# Patient Record
Sex: FEMALE | Race: OTHER | HISPANIC OR LATINO | URBAN - METROPOLITAN AREA
[De-identification: names, ages, dates, MRNs, and addresses within clinical notes are randomized per-mention and may not be internally consistent; named-entity substitution may affect disease eponyms.]

---

## 2018-04-03 ENCOUNTER — EMERGENCY (EMERGENCY)
Facility: HOSPITAL | Age: 23
LOS: 1 days | Discharge: HOME | End: 2018-04-03
Admitting: EMERGENCY MEDICINE
Payer: MEDICAID

## 2018-04-03 VITALS
RESPIRATION RATE: 15 BRPM | SYSTOLIC BLOOD PRESSURE: 125 MMHG | TEMPERATURE: 98 F | OXYGEN SATURATION: 99 % | HEART RATE: 88 BPM | DIASTOLIC BLOOD PRESSURE: 83 MMHG

## 2018-04-03 PROCEDURE — 99283 EMERGENCY DEPT VISIT LOW MDM: CPT

## 2018-04-03 NOTE — ED ADULT TRIAGE NOTE - CHIEF COMPLAINT QUOTE
pt c/o vaginal spotting x 1 month. was seen by obgyn, states she did not do pap smear but said she may have polycystic ovarian syndrome. no hx of anemia or blood transfusion. LMP 3/19. appears in nad, no c/o pain

## 2018-04-04 LAB
APPEARANCE UR: CLEAR — SIGNIFICANT CHANGE UP
BASOPHILS # BLD AUTO: 0.03 K/UL — SIGNIFICANT CHANGE UP (ref 0–0.2)
BASOPHILS NFR BLD AUTO: 0.3 % — SIGNIFICANT CHANGE UP (ref 0–2)
BILIRUB UR-MCNC: NEGATIVE — SIGNIFICANT CHANGE UP
BLOOD UR QL VISUAL: HIGH
COLOR SPEC: YELLOW — SIGNIFICANT CHANGE UP
EOSINOPHIL # BLD AUTO: 0.15 K/UL — SIGNIFICANT CHANGE UP (ref 0–0.5)
EOSINOPHIL NFR BLD AUTO: 1.7 % — SIGNIFICANT CHANGE UP (ref 0–6)
GLUCOSE UR-MCNC: NEGATIVE — SIGNIFICANT CHANGE UP
HCT VFR BLD CALC: 38.6 % — SIGNIFICANT CHANGE UP (ref 34.5–45)
HGB BLD-MCNC: 13.5 G/DL — SIGNIFICANT CHANGE UP (ref 11.5–15.5)
IMM GRANULOCYTES # BLD AUTO: 0.03 # — SIGNIFICANT CHANGE UP
IMM GRANULOCYTES NFR BLD AUTO: 0.3 % — SIGNIFICANT CHANGE UP (ref 0–1.5)
KETONES UR-MCNC: SIGNIFICANT CHANGE UP
LEUKOCYTE ESTERASE UR-ACNC: NEGATIVE — SIGNIFICANT CHANGE UP
LYMPHOCYTES # BLD AUTO: 2.61 K/UL — SIGNIFICANT CHANGE UP (ref 1–3.3)
LYMPHOCYTES # BLD AUTO: 29.7 % — SIGNIFICANT CHANGE UP (ref 13–44)
MCHC RBC-ENTMCNC: 31.3 PG — SIGNIFICANT CHANGE UP (ref 27–34)
MCHC RBC-ENTMCNC: 35 % — SIGNIFICANT CHANGE UP (ref 32–36)
MCV RBC AUTO: 89.4 FL — SIGNIFICANT CHANGE UP (ref 80–100)
MONOCYTES # BLD AUTO: 0.68 K/UL — SIGNIFICANT CHANGE UP (ref 0–0.9)
MONOCYTES NFR BLD AUTO: 7.7 % — SIGNIFICANT CHANGE UP (ref 2–14)
MUCOUS THREADS # UR AUTO: SIGNIFICANT CHANGE UP
NEUTROPHILS # BLD AUTO: 5.28 K/UL — SIGNIFICANT CHANGE UP (ref 1.8–7.4)
NEUTROPHILS NFR BLD AUTO: 60.3 % — SIGNIFICANT CHANGE UP (ref 43–77)
NITRITE UR-MCNC: NEGATIVE — SIGNIFICANT CHANGE UP
NON-SQ EPI CELLS # UR AUTO: <1 — SIGNIFICANT CHANGE UP
NRBC # FLD: 0 — SIGNIFICANT CHANGE UP
PH UR: 6 — SIGNIFICANT CHANGE UP (ref 4.6–8)
PLATELET # BLD AUTO: 316 K/UL — SIGNIFICANT CHANGE UP (ref 150–400)
PMV BLD: 10.3 FL — SIGNIFICANT CHANGE UP (ref 7–13)
PROT UR-MCNC: 20 MG/DL — SIGNIFICANT CHANGE UP
RBC # BLD: 4.32 M/UL — SIGNIFICANT CHANGE UP (ref 3.8–5.2)
RBC # FLD: 12.1 % — SIGNIFICANT CHANGE UP (ref 10.3–14.5)
RBC CASTS # UR COMP ASSIST: HIGH (ref 0–?)
SP GR SPEC: 1.03 — SIGNIFICANT CHANGE UP (ref 1–1.04)
SQUAMOUS # UR AUTO: SIGNIFICANT CHANGE UP
UROBILINOGEN FLD QL: NORMAL MG/DL — SIGNIFICANT CHANGE UP
WBC # BLD: 8.78 K/UL — SIGNIFICANT CHANGE UP (ref 3.8–10.5)
WBC # FLD AUTO: 8.78 K/UL — SIGNIFICANT CHANGE UP (ref 3.8–10.5)
WBC UR QL: SIGNIFICANT CHANGE UP (ref 0–?)

## 2018-04-04 NOTE — ED PROVIDER NOTE - PLAN OF CARE
Rest, drink plenty of fluids.  Advance activity as tolerated.  Continue all previously prescribed medications as directed.  Follow up with your primary care physician in 48-72 hours- bring copies of your results.  Return to the Emergency Department for dizziness, lightheadedness, palpitations, heavy bleeding, worsening or persistent symptoms OR ANY NEW OR CONCERNING SYMPTOMS.

## 2018-04-04 NOTE — ED PROVIDER NOTE - CHPI ED SYMPTOMS NEG
no lightheadedness, no palpitations, no chest pain, no SOB, no abdominal pain/no chills/no fever/no burning urination/no dysuria/no hematuria

## 2018-04-04 NOTE — ED PROVIDER NOTE - OBJECTIVE STATEMENT
24y/o F with PMHx of irregular periods presents to the ED c/o 1mo of intermittent vaginal spotting. Pt visited her GYN on Thursday who bella blood and is working her up for PCOS. Pt is to call tomorrow to f/u with another appointment. Pt states she came in today because her GYN "did not have time" to do an outpatient pap smear and is still spotting. Denies fevers/chills, lightheadedness, palpitations, chest pain, SOB, abdominal pain, burning urination, dysuria, hematuria, any other complaints. NKDA.

## 2018-04-04 NOTE — ED PROVIDER NOTE - PROGRESS NOTE DETAILS
susan warner: ua without infection, cbc wnl. ucg negative. will dc with ob/gyn is supposed to call tomorrow. pt agrees with plan. ready for dc.

## 2018-04-04 NOTE — ED PROVIDER NOTE - CARE PLAN
Assessment and plan of treatment:	Rest, drink plenty of fluids.  Advance activity as tolerated.  Continue all previously prescribed medications as directed.  Follow up with your primary care physician in 48-72 hours- bring copies of your results.  Return to the Emergency Department for dizziness, lightheadedness, palpitations, heavy bleeding, worsening or persistent symptoms OR ANY NEW OR CONCERNING SYMPTOMS. Principal Discharge DX:	Vaginal spotting  Assessment and plan of treatment:	Rest, drink plenty of fluids.  Advance activity as tolerated.  Continue all previously prescribed medications as directed.  Follow up with your primary care physician in 48-72 hours- bring copies of your results.  Return to the Emergency Department for dizziness, lightheadedness, palpitations, heavy bleeding, worsening or persistent symptoms OR ANY NEW OR CONCERNING SYMPTOMS.

## 2018-04-04 NOTE — ED PROVIDER NOTE - MEDICAL DECISION MAKING DETAILS
24y/o F with irregular periods with vaginal spotting. Will check UCG, UA, urine culture, CBC given length of bleed, outpatient GYN f/u. 24y/o F with irregular periods with vaginal spotting. Will check UCG, UA, urine culture, CBC given length of bleed, likely outpatient GYN f/u.

## 2018-04-05 LAB
BACTERIA UR CULT: SIGNIFICANT CHANGE UP
SPECIMEN SOURCE: SIGNIFICANT CHANGE UP

## 2020-01-03 ENCOUNTER — EMERGENCY (EMERGENCY)
Facility: HOSPITAL | Age: 25
LOS: 1 days | Discharge: HOME | End: 2020-01-03
Admitting: EMERGENCY MEDICINE
Payer: SELF-PAY

## 2020-01-03 VITALS
RESPIRATION RATE: 16 BRPM | HEART RATE: 96 BPM | TEMPERATURE: 98 F | SYSTOLIC BLOOD PRESSURE: 122 MMHG | DIASTOLIC BLOOD PRESSURE: 73 MMHG | OXYGEN SATURATION: 100 %

## 2020-01-03 PROBLEM — N92.6 IRREGULAR MENSTRUATION, UNSPECIFIED: Chronic | Status: ACTIVE | Noted: 2018-04-04

## 2020-01-03 PROCEDURE — 99284 EMERGENCY DEPT VISIT MOD MDM: CPT

## 2020-01-03 PROCEDURE — 73564 X-RAY EXAM KNEE 4 OR MORE: CPT | Mod: 26,RT

## 2020-01-03 RX ORDER — KETOROLAC TROMETHAMINE 30 MG/ML
30 SYRINGE (ML) INJECTION ONCE
Refills: 0 | Status: DISCONTINUED | OUTPATIENT
Start: 2020-01-03 | End: 2020-01-03

## 2020-01-03 RX ADMIN — Medication 30 MILLIGRAM(S): at 15:39

## 2020-01-03 NOTE — ED ADULT TRIAGE NOTE - CHIEF COMPLAINT QUOTE
A&OX3 c/o medications refill pt was treated for blister on upper lip last week and states its still there would like additional medication A&OX3 c/o right knee s/p fall one week ago, denies numbness tingling

## 2020-01-03 NOTE — ED PROVIDER NOTE - OBJECTIVE STATEMENT
23yo F w/ no pmhx presents c/o R knee pain x 3 weeks. Pt had mechanical fall, +blunt trauma to R knee, now w/ pain w/ weight bearing. Taking NSAID's w/ no relief. Denies numbness or paresthesias of extremities, calf swelling, fevers, chills, cp, sob.

## 2020-01-03 NOTE — ED PROVIDER NOTE - LOWER EXTREMITY EXAM, RIGHT
medial joint line tenderness, +valgus stress test, neg lachmans ,neg ant/post drawer, pt ambulatory, no redness or warmth of knee joint, no significant effusion

## 2020-01-03 NOTE — ED PROVIDER NOTE - PATIENT PORTAL LINK FT
You can access the FollowMyHealth Patient Portal offered by [facility] by registering at the following website: [email or web address]. By joining [employer]’s FollowMyHealth portal, you will also be able to view your health information using other applications (apps) compatible with our system.

## 2020-01-03 NOTE — ED PROVIDER NOTE - CARE PLAN
Principal Discharge DX:	Knee pain  Assessment and plan of treatment:	Follow up with your primary care provider within 48 hours  Follow up with an orthopedic doctor within one week  Take Motrin 600mg every 8 hours as needed for pain, take with food  Return to the emergency department with any worsening or concerning symptoms, swelling, numbness/tingling, inability to bear weight or any other concerns.

## 2020-11-03 ENCOUNTER — RESULT REVIEW (OUTPATIENT)
Age: 25
End: 2020-11-03

## 2021-11-26 ENCOUNTER — TRANSCRIPTION ENCOUNTER (OUTPATIENT)
Age: 26
End: 2021-11-26

## 2022-02-05 ENCOUNTER — TRANSCRIPTION ENCOUNTER (OUTPATIENT)
Age: 27
End: 2022-02-05

## 2022-02-14 PROBLEM — Z00.00 ENCOUNTER FOR PREVENTIVE HEALTH EXAMINATION: Status: ACTIVE | Noted: 2022-02-14

## 2022-02-15 ENCOUNTER — APPOINTMENT (OUTPATIENT)
Dept: PEDIATRIC CARDIOLOGY | Facility: CLINIC | Age: 27
End: 2022-02-15
Payer: COMMERCIAL

## 2022-02-15 PROCEDURE — 93325 DOPPLER ECHO COLOR FLOW MAPG: CPT | Mod: 59

## 2022-02-15 PROCEDURE — 76821 MIDDLE CEREBRAL ARTERY ECHO: CPT

## 2022-02-15 PROCEDURE — 76825 ECHO EXAM OF FETAL HEART: CPT

## 2022-02-15 PROCEDURE — 76827 ECHO EXAM OF FETAL HEART: CPT

## 2022-02-15 PROCEDURE — 99241 OFFICE CONSULTATION NEW/ESTAB PATIENT 15 MIN: CPT

## 2022-02-15 PROCEDURE — 76820 UMBILICAL ARTERY ECHO: CPT

## 2022-06-11 ENCOUNTER — INPATIENT (INPATIENT)
Facility: HOSPITAL | Age: 27
LOS: 1 days | Discharge: HOME | End: 2022-06-13
Attending: SURGERY | Admitting: SURGERY
Payer: MEDICAID

## 2022-06-11 VITALS
OXYGEN SATURATION: 100 % | RESPIRATION RATE: 18 BRPM | DIASTOLIC BLOOD PRESSURE: 85 MMHG | TEMPERATURE: 98 F | SYSTOLIC BLOOD PRESSURE: 135 MMHG | HEART RATE: 96 BPM

## 2022-06-11 DIAGNOSIS — Z98.891 HISTORY OF UTERINE SCAR FROM PREVIOUS SURGERY: Chronic | ICD-10-CM

## 2022-06-11 DIAGNOSIS — K85.10 BILIARY ACUTE PANCREATITIS WITHOUT NECROSIS OR INFECTION: ICD-10-CM

## 2022-06-11 LAB
ALBUMIN SERPL ELPH-MCNC: 4.4 G/DL — SIGNIFICANT CHANGE UP (ref 3.3–5)
ALP SERPL-CCNC: 155 U/L — HIGH (ref 40–120)
ALT FLD-CCNC: 67 U/L — HIGH (ref 4–33)
ANION GAP SERPL CALC-SCNC: 13 MMOL/L — SIGNIFICANT CHANGE UP (ref 7–14)
APPEARANCE UR: CLEAR — SIGNIFICANT CHANGE UP
AST SERPL-CCNC: 59 U/L — HIGH (ref 4–32)
BASOPHILS # BLD AUTO: 0.01 K/UL — SIGNIFICANT CHANGE UP (ref 0–0.2)
BASOPHILS NFR BLD AUTO: 0.1 % — SIGNIFICANT CHANGE UP (ref 0–2)
BILIRUB SERPL-MCNC: 0.7 MG/DL — SIGNIFICANT CHANGE UP (ref 0.2–1.2)
BILIRUB UR-MCNC: ABNORMAL
BLOOD GAS VENOUS COMPREHENSIVE RESULT: SIGNIFICANT CHANGE UP
BUN SERPL-MCNC: 9 MG/DL — SIGNIFICANT CHANGE UP (ref 7–23)
CALCIUM SERPL-MCNC: 9 MG/DL — SIGNIFICANT CHANGE UP (ref 8.4–10.5)
CHLORIDE SERPL-SCNC: 104 MMOL/L — SIGNIFICANT CHANGE UP (ref 98–107)
CHOLEST SERPL-MCNC: 164 MG/DL — SIGNIFICANT CHANGE UP
CO2 SERPL-SCNC: 23 MMOL/L — SIGNIFICANT CHANGE UP (ref 22–31)
COLOR SPEC: ABNORMAL
CREAT SERPL-MCNC: 0.62 MG/DL — SIGNIFICANT CHANGE UP (ref 0.5–1.3)
DIFF PNL FLD: ABNORMAL
EGFR: 127 ML/MIN/1.73M2 — SIGNIFICANT CHANGE UP
EOSINOPHIL # BLD AUTO: 0.05 K/UL — SIGNIFICANT CHANGE UP (ref 0–0.5)
EOSINOPHIL NFR BLD AUTO: 0.6 % — SIGNIFICANT CHANGE UP (ref 0–6)
FLUAV AG NPH QL: SIGNIFICANT CHANGE UP
FLUBV AG NPH QL: SIGNIFICANT CHANGE UP
GLUCOSE SERPL-MCNC: 101 MG/DL — HIGH (ref 70–99)
GLUCOSE UR QL: NEGATIVE — SIGNIFICANT CHANGE UP
HCT VFR BLD CALC: 36.8 % — SIGNIFICANT CHANGE UP (ref 34.5–45)
HDLC SERPL-MCNC: 62 MG/DL — SIGNIFICANT CHANGE UP
HGB BLD-MCNC: 11.7 G/DL — SIGNIFICANT CHANGE UP (ref 11.5–15.5)
IANC: 6.12 K/UL — SIGNIFICANT CHANGE UP (ref 1.8–7.4)
IMM GRANULOCYTES NFR BLD AUTO: 0.1 % — SIGNIFICANT CHANGE UP (ref 0–1.5)
KETONES UR-MCNC: ABNORMAL
LEUKOCYTE ESTERASE UR-ACNC: NEGATIVE — SIGNIFICANT CHANGE UP
LIDOCAIN IGE QN: >3000 U/L — SIGNIFICANT CHANGE UP (ref 7–60)
LIPID PNL WITH DIRECT LDL SERPL: 83 MG/DL — SIGNIFICANT CHANGE UP
LYMPHOCYTES # BLD AUTO: 1.29 K/UL — SIGNIFICANT CHANGE UP (ref 1–3.3)
LYMPHOCYTES # BLD AUTO: 16.4 % — SIGNIFICANT CHANGE UP (ref 13–44)
MCHC RBC-ENTMCNC: 27.9 PG — SIGNIFICANT CHANGE UP (ref 27–34)
MCHC RBC-ENTMCNC: 31.8 GM/DL — LOW (ref 32–36)
MCV RBC AUTO: 87.6 FL — SIGNIFICANT CHANGE UP (ref 80–100)
MONOCYTES # BLD AUTO: 0.38 K/UL — SIGNIFICANT CHANGE UP (ref 0–0.9)
MONOCYTES NFR BLD AUTO: 4.8 % — SIGNIFICANT CHANGE UP (ref 2–14)
NEUTROPHILS # BLD AUTO: 6.12 K/UL — SIGNIFICANT CHANGE UP (ref 1.8–7.4)
NEUTROPHILS NFR BLD AUTO: 78 % — HIGH (ref 43–77)
NITRITE UR-MCNC: NEGATIVE — SIGNIFICANT CHANGE UP
NON HDL CHOLESTEROL: 102 MG/DL — SIGNIFICANT CHANGE UP
NRBC # BLD: 0 /100 WBCS — SIGNIFICANT CHANGE UP
NRBC # FLD: 0 K/UL — SIGNIFICANT CHANGE UP
PH UR: 6.5 — SIGNIFICANT CHANGE UP (ref 5–8)
PLATELET # BLD AUTO: 358 K/UL — SIGNIFICANT CHANGE UP (ref 150–400)
POTASSIUM SERPL-MCNC: 3.7 MMOL/L — SIGNIFICANT CHANGE UP (ref 3.5–5.3)
POTASSIUM SERPL-SCNC: 3.7 MMOL/L — SIGNIFICANT CHANGE UP (ref 3.5–5.3)
PROT SERPL-MCNC: 7.3 G/DL — SIGNIFICANT CHANGE UP (ref 6–8.3)
PROT UR-MCNC: ABNORMAL
RBC # BLD: 4.2 M/UL — SIGNIFICANT CHANGE UP (ref 3.8–5.2)
RBC # FLD: 12.9 % — SIGNIFICANT CHANGE UP (ref 10.3–14.5)
RSV RNA NPH QL NAA+NON-PROBE: SIGNIFICANT CHANGE UP
SARS-COV-2 RNA SPEC QL NAA+PROBE: SIGNIFICANT CHANGE UP
SODIUM SERPL-SCNC: 140 MMOL/L — SIGNIFICANT CHANGE UP (ref 135–145)
SP GR SPEC: 1.05 — SIGNIFICANT CHANGE UP (ref 1–1.05)
TRIGL SERPL-MCNC: 96 MG/DL — SIGNIFICANT CHANGE UP
UROBILINOGEN FLD QL: ABNORMAL
WBC # BLD: 7.86 K/UL — SIGNIFICANT CHANGE UP (ref 3.8–10.5)
WBC # FLD AUTO: 7.86 K/UL — SIGNIFICANT CHANGE UP (ref 3.8–10.5)

## 2022-06-11 PROCEDURE — 99285 EMERGENCY DEPT VISIT HI MDM: CPT

## 2022-06-11 PROCEDURE — 76705 ECHO EXAM OF ABDOMEN: CPT | Mod: 26

## 2022-06-11 PROCEDURE — 99222 1ST HOSP IP/OBS MODERATE 55: CPT

## 2022-06-11 RX ORDER — ENOXAPARIN SODIUM 100 MG/ML
40 INJECTION SUBCUTANEOUS EVERY 24 HOURS
Refills: 0 | Status: DISCONTINUED | OUTPATIENT
Start: 2022-06-11 | End: 2022-06-13

## 2022-06-11 RX ORDER — HYDROMORPHONE HYDROCHLORIDE 2 MG/ML
0.5 INJECTION INTRAMUSCULAR; INTRAVENOUS; SUBCUTANEOUS EVERY 4 HOURS
Refills: 0 | Status: DISCONTINUED | OUTPATIENT
Start: 2022-06-11 | End: 2022-06-12

## 2022-06-11 RX ORDER — ONDANSETRON 8 MG/1
4 TABLET, FILM COATED ORAL ONCE
Refills: 0 | Status: COMPLETED | OUTPATIENT
Start: 2022-06-11 | End: 2022-06-11

## 2022-06-11 RX ORDER — HYDROMORPHONE HYDROCHLORIDE 2 MG/ML
1 INJECTION INTRAMUSCULAR; INTRAVENOUS; SUBCUTANEOUS EVERY 4 HOURS
Refills: 0 | Status: DISCONTINUED | OUTPATIENT
Start: 2022-06-11 | End: 2022-06-12

## 2022-06-11 RX ORDER — MORPHINE SULFATE 50 MG/1
4 CAPSULE, EXTENDED RELEASE ORAL ONCE
Refills: 0 | Status: DISCONTINUED | OUTPATIENT
Start: 2022-06-11 | End: 2022-06-11

## 2022-06-11 RX ORDER — ONDANSETRON 8 MG/1
4 TABLET, FILM COATED ORAL EVERY 8 HOURS
Refills: 0 | Status: DISCONTINUED | OUTPATIENT
Start: 2022-06-11 | End: 2022-06-13

## 2022-06-11 RX ORDER — SODIUM CHLORIDE 9 MG/ML
1000 INJECTION, SOLUTION INTRAVENOUS
Refills: 0 | Status: DISCONTINUED | OUTPATIENT
Start: 2022-06-11 | End: 2022-06-11

## 2022-06-11 RX ORDER — ACETAMINOPHEN 500 MG
975 TABLET ORAL EVERY 6 HOURS
Refills: 0 | Status: DISCONTINUED | OUTPATIENT
Start: 2022-06-11 | End: 2022-06-13

## 2022-06-11 RX ORDER — SODIUM CHLORIDE 9 MG/ML
1000 INJECTION INTRAMUSCULAR; INTRAVENOUS; SUBCUTANEOUS ONCE
Refills: 0 | Status: COMPLETED | OUTPATIENT
Start: 2022-06-11 | End: 2022-06-11

## 2022-06-11 RX ORDER — SODIUM CHLORIDE 9 MG/ML
1000 INJECTION, SOLUTION INTRAVENOUS
Refills: 0 | Status: DISCONTINUED | OUTPATIENT
Start: 2022-06-11 | End: 2022-06-12

## 2022-06-11 RX ADMIN — SODIUM CHLORIDE 125 MILLILITER(S): 9 INJECTION, SOLUTION INTRAVENOUS at 20:02

## 2022-06-11 RX ADMIN — SODIUM CHLORIDE 125 MILLILITER(S): 9 INJECTION, SOLUTION INTRAVENOUS at 22:02

## 2022-06-11 RX ADMIN — ENOXAPARIN SODIUM 40 MILLIGRAM(S): 100 INJECTION SUBCUTANEOUS at 22:02

## 2022-06-11 RX ADMIN — ONDANSETRON 4 MILLIGRAM(S): 8 TABLET, FILM COATED ORAL at 15:18

## 2022-06-11 RX ADMIN — MORPHINE SULFATE 4 MILLIGRAM(S): 50 CAPSULE, EXTENDED RELEASE ORAL at 15:21

## 2022-06-11 RX ADMIN — Medication 975 MILLIGRAM(S): at 20:07

## 2022-06-11 RX ADMIN — SODIUM CHLORIDE 1000 MILLILITER(S): 9 INJECTION INTRAMUSCULAR; INTRAVENOUS; SUBCUTANEOUS at 15:18

## 2022-06-11 RX ADMIN — SODIUM CHLORIDE 250 MILLILITER(S): 9 INJECTION, SOLUTION INTRAVENOUS at 18:26

## 2022-06-11 NOTE — ED PROVIDER NOTE - CLINICAL SUMMARY MEDICAL DECISION MAKING FREE TEXT BOX
28 y/o female with pmhx of cholelithiasis, 4 weeks post partum with , presents to ED c/o RUQ pain x 2 days. Worse after eating a meal. c/o severe pain with 1 episode of vomiting. Feels similar to her pain in April when she was diagnosed with gallstones when pregnant, did not have surgical intervention at that time. on exam pt well appearing, +ruq TTP, negative murphys. plan to check labs, lfts, RUQ sonogram, pain control, antiemetics, reassess.

## 2022-06-11 NOTE — H&P ADULT - NSHPPHYSICALEXAM_GEN_ALL_CORE
VITALS:  Vital Signs Last 24 Hrs  T(C): 36.9 (11 Jun 2022 16:48), Max: 36.9 (11 Jun 2022 16:48)  T(F): 98.4 (11 Jun 2022 16:48), Max: 98.4 (11 Jun 2022 16:48)  HR: 84 (11 Jun 2022 16:48) (84 - 96)  BP: 142/88 (11 Jun 2022 16:48) (135/85 - 142/88)  RR: 16 (11 Jun 2022 16:48) (16 - 18)  SpO2: 100% (11 Jun 2022 16:48) (100% - 100%)    PHYSICAL EXAM:  Gen: No acute distress.  Abd: Soft, epigastric and LUQ tenderness, nondistended, no rebound, no guarding.  Ext: Warm and well-perfused

## 2022-06-11 NOTE — ED ADULT NURSE NOTE - OBJECTIVE STATEMENT
28 y/o female presenting to room 13. Patient aAOX4, ambulatory at baseline. Patient coming to ER complaining of RUQ pain. Patient states it feels like gallbladder pain. Patient states she has had gallstones before and notes the pain is similar. Patient s/p  2 wks ago. Denies chest pain, headache and dizziness. Breathing even unlabored. #18g placed to LAC. labs drawn and sent as per MD order. Patient awaiting US.

## 2022-06-11 NOTE — H&P ADULT - ASSESSMENT
28yo F with a history of gallstones who is 4 wks postpartum after a c/section presenting with abdominal pain found to have gallstone pancreatitis.    PLAN:  - Admit to B team surgery under Carrol Stephenson, floor bed  - NPO/IVF  - Pain control as needed  - Trend abdominal exam  - Trend LFTs and tbili  - Recommend lap jessee when pancreatitis resolves, but patient initially refusing. Will continue to discuss.    D/w Dr. Cho, PGY2  B Team Surgery   [id number]

## 2022-06-11 NOTE — ED PROVIDER NOTE - ATTENDING APP SHARED VISIT CONTRIBUTION OF CARE
I (Cassie) agree with above, I performed a history and physical. Counseled isauro medical staff, physician assistant, and/or medical student on medical decision making as documented. Medical decisions and treatment interventions were made in real time during the patient encounter. Additionally and/or with the following exceptions: The patient presented to the ED with abdominal pain, started earlier today, Has known gallstones. Pain is severe, sharp. Was very uncomfortable appearing and right upper quadrant tenderness to palpation. complete blood count within normal limits, complete metabolic panel with mildly elevated lft's, lipase>3000, surgery consulted for gall stone pancreatitis. The patient's condition was not amenable to outpatient treatment due either the lack of feasibility of outpatient care coordination, possibility for further decompensation with adverse outcome if discharge, or treatments and diagnostic  modalities only available during an inpatient hospitalization.

## 2022-06-11 NOTE — PATIENT PROFILE ADULT - FALL HARM RISK - UNIVERSAL INTERVENTIONS
Bed in lowest position, wheels locked, appropriate side rails in place/Call bell, personal items and telephone in reach/Instruct patient to call for assistance before getting out of bed or chair/Non-slip footwear when patient is out of bed/[place] to call system/Physically safe environment - no spills, clutter or unnecessary equipment/Purposeful Proactive Rounding/Room/bathroom lighting operational, light cord in reach

## 2022-06-11 NOTE — H&P ADULT - ATTENDING COMMENTS
GS Pancreatitis  a.  Admit to B surgery/ TEGAN PICKENS  b.  NPO at this time  c.  Start IVF for resuscitation  d.  CT reviewed  e.  Chemical DVT prophylaxis  f.  Possible inpatient5 cholecystectomy

## 2022-06-11 NOTE — ED PROVIDER NOTE - OBJECTIVE STATEMENT
26 y/o female with pmhx of cholelithiasis, 4 weeks post partum with , presents to ED c/o RUQ pain x 2 days. Worse after eating a meal. c/o severe pain with 1 episode of vomiting. No diarrhea. No fevers or chills. No dysuria. Feels similar to her pain in April when she was diagnosed with gallstones when pregnant, did not have surgical intervention at that time.

## 2022-06-11 NOTE — ED ADULT TRIAGE NOTE - CHIEF COMPLAINT QUOTE
Pt reporting tot he ED for RUQ pain radiating to mid abdomen starting last night. Nausea and vomiting X 1 last night. PMH of gallstones, reports pain feels similar to when she was dx in april.

## 2022-06-11 NOTE — H&P ADULT - HISTORY OF PRESENT ILLNESS
Patient is a 28yo F with a history of gallstones who is 4 wks postpartum after a c/section presenting with 1 day of epigastric pain. Patient reports onset of epigastric, RUQ and LUQ abdominal pain last night at 7pm after dinner, associated with nausea and one episode of emesis. Denies fevers or chills. Had similar pain once before but it went away quickly.    In the ED, patient stable. Labs showed WBC 6, tbili 0.7, AST/ALT 59/67, lipase >3000. RUQ US showed CBD 3mm and gallstones but no signs of cholecystitis.

## 2022-06-11 NOTE — ED ADULT NURSE NOTE - NSIMPLEMENTINTERV_GEN_ALL_ED
Implemented All Universal Safety Interventions:  [place] to call system. Call bell, personal items and telephone within reach. Instruct patient to call for assistance. Room bathroom lighting operational. Non-slip footwear when patient is off stretcher. Physically safe environment: no spills, clutter or unnecessary equipment. Stretcher in lowest position, wheels locked, appropriate side rails in place.

## 2022-06-11 NOTE — ED PROVIDER NOTE - NS ED ATTENDING STATEMENT MOD
This was a shared visit with the REAGAN. I reviewed and verified the documentation and independently performed the documented:

## 2022-06-11 NOTE — H&P ADULT - NSHPLABSRESULTS_GEN_ALL_CORE
LABS:                          11.7   7.86  )-----------( 358      ( 2022 15:17 )             36.8           140  |  104  |  9   ----------------------------<  101<H>  3.7   |  23  |  0.62    Ca    9.0      2022 15:17    TPro  7.3  /  Alb  4.4  /  TBili  0.7  /  DBili  x   /  AST  59<H>  /  ALT  67<H>  /  AlkPhos  155<H>           Urinalysis Basic - ( 2022 15:20 )    Color: Maame / Appearance: Clear / S.046 / pH: x  Gluc: x / Ketone: Trace  / Bili: Small / Urobili: 3 mg/dL   Blood: x / Protein: 100 mg/dL / Nitrite: Negative   Leuk Esterase: Negative / RBC: 7 /HPF / WBC 11 /HPF   Sq Epi: x / Non Sq Epi: 15 /HPF / Bacteria: Negative    _______________________________________  RADIOLOGY & ADDITIONAL STUDIES:    < from: US Abdomen Upper Quadrant Right (22 @ 15:42) >    ACC: [id number] EXAM:  US ABDOMEN RT UPR QUADRANT                          PROCEDURE DATE:  2022      INTERPRETATION:  CLINICAL INFORMATION: Gallstones    COMPARISON: None available.    TECHNIQUE: Sonography of the right upper quadrant.    FINDINGS:  Liver: Within normal limits.  Bile ducts: Normal caliber. Common bile duct measures 3 mm.  Gallbladder: Cholelithiasis.  No sonographic evidence of cholecystitis.  Pancreas: Visualized portions are within normal limits.  Right kidney: 11.3 cm. No hydronephrosis.  Ascites: None.  IVC: Visualized portions are within normal limits.    IMPRESSION:  Cholelithiasis.    < end of copied text >

## 2022-06-12 LAB
ALBUMIN SERPL ELPH-MCNC: 4 G/DL — SIGNIFICANT CHANGE UP (ref 3.3–5)
ALP SERPL-CCNC: 139 U/L — HIGH (ref 40–120)
ALT FLD-CCNC: 57 U/L — HIGH (ref 4–33)
ANION GAP SERPL CALC-SCNC: 11 MMOL/L — SIGNIFICANT CHANGE UP (ref 7–14)
AST SERPL-CCNC: 37 U/L — HIGH (ref 4–32)
BILIRUB SERPL-MCNC: 0.6 MG/DL — SIGNIFICANT CHANGE UP (ref 0.2–1.2)
BUN SERPL-MCNC: 8 MG/DL — SIGNIFICANT CHANGE UP (ref 7–23)
CALCIUM SERPL-MCNC: 8.8 MG/DL — SIGNIFICANT CHANGE UP (ref 8.4–10.5)
CHLORIDE SERPL-SCNC: 104 MMOL/L — SIGNIFICANT CHANGE UP (ref 98–107)
CO2 SERPL-SCNC: 24 MMOL/L — SIGNIFICANT CHANGE UP (ref 22–31)
CREAT SERPL-MCNC: 0.61 MG/DL — SIGNIFICANT CHANGE UP (ref 0.5–1.3)
CULTURE RESULTS: SIGNIFICANT CHANGE UP
EGFR: 127 ML/MIN/1.73M2 — SIGNIFICANT CHANGE UP
GLUCOSE SERPL-MCNC: 82 MG/DL — SIGNIFICANT CHANGE UP (ref 70–99)
HCT VFR BLD CALC: 32.7 % — LOW (ref 34.5–45)
HGB BLD-MCNC: 10.4 G/DL — LOW (ref 11.5–15.5)
MAGNESIUM SERPL-MCNC: 1.9 MG/DL — SIGNIFICANT CHANGE UP (ref 1.6–2.6)
MCHC RBC-ENTMCNC: 28.3 PG — SIGNIFICANT CHANGE UP (ref 27–34)
MCHC RBC-ENTMCNC: 31.8 GM/DL — LOW (ref 32–36)
MCV RBC AUTO: 88.9 FL — SIGNIFICANT CHANGE UP (ref 80–100)
NRBC # BLD: 0 /100 WBCS — SIGNIFICANT CHANGE UP
NRBC # FLD: 0 K/UL — SIGNIFICANT CHANGE UP
PHOSPHATE SERPL-MCNC: 4 MG/DL — SIGNIFICANT CHANGE UP (ref 2.5–4.5)
PLATELET # BLD AUTO: 292 K/UL — SIGNIFICANT CHANGE UP (ref 150–400)
POTASSIUM SERPL-MCNC: 3.3 MMOL/L — LOW (ref 3.5–5.3)
POTASSIUM SERPL-SCNC: 3.3 MMOL/L — LOW (ref 3.5–5.3)
PROT SERPL-MCNC: 6.8 G/DL — SIGNIFICANT CHANGE UP (ref 6–8.3)
RBC # BLD: 3.68 M/UL — LOW (ref 3.8–5.2)
RBC # FLD: 13.1 % — SIGNIFICANT CHANGE UP (ref 10.3–14.5)
SODIUM SERPL-SCNC: 139 MMOL/L — SIGNIFICANT CHANGE UP (ref 135–145)
SPECIMEN SOURCE: SIGNIFICANT CHANGE UP
WBC # BLD: 5.04 K/UL — SIGNIFICANT CHANGE UP (ref 3.8–10.5)
WBC # FLD AUTO: 5.04 K/UL — SIGNIFICANT CHANGE UP (ref 3.8–10.5)

## 2022-06-12 PROCEDURE — 99232 SBSQ HOSP IP/OBS MODERATE 35: CPT

## 2022-06-12 RX ORDER — OXYCODONE HYDROCHLORIDE 5 MG/1
5 TABLET ORAL EVERY 4 HOURS
Refills: 0 | Status: DISCONTINUED | OUTPATIENT
Start: 2022-06-12 | End: 2022-06-13

## 2022-06-12 RX ORDER — HYDROMORPHONE HYDROCHLORIDE 2 MG/ML
1 INJECTION INTRAMUSCULAR; INTRAVENOUS; SUBCUTANEOUS EVERY 4 HOURS
Refills: 0 | Status: DISCONTINUED | OUTPATIENT
Start: 2022-06-12 | End: 2022-06-13

## 2022-06-12 RX ORDER — POTASSIUM CHLORIDE 20 MEQ
40 PACKET (EA) ORAL EVERY 4 HOURS
Refills: 0 | Status: COMPLETED | OUTPATIENT
Start: 2022-06-12 | End: 2022-06-12

## 2022-06-12 RX ORDER — DEXTROSE MONOHYDRATE, SODIUM CHLORIDE, AND POTASSIUM CHLORIDE 50; .745; 4.5 G/1000ML; G/1000ML; G/1000ML
1000 INJECTION, SOLUTION INTRAVENOUS
Refills: 0 | Status: DISCONTINUED | OUTPATIENT
Start: 2022-06-12 | End: 2022-06-13

## 2022-06-12 RX ORDER — OXYCODONE HYDROCHLORIDE 5 MG/1
10 TABLET ORAL EVERY 4 HOURS
Refills: 0 | Status: DISCONTINUED | OUTPATIENT
Start: 2022-06-12 | End: 2022-06-13

## 2022-06-12 RX ADMIN — Medication 40 MILLIEQUIVALENT(S): at 14:12

## 2022-06-12 RX ADMIN — Medication 975 MILLIGRAM(S): at 17:48

## 2022-06-12 RX ADMIN — Medication 975 MILLIGRAM(S): at 23:14

## 2022-06-12 RX ADMIN — OXYCODONE HYDROCHLORIDE 10 MILLIGRAM(S): 5 TABLET ORAL at 21:34

## 2022-06-12 RX ADMIN — Medication 975 MILLIGRAM(S): at 23:44

## 2022-06-12 RX ADMIN — Medication 975 MILLIGRAM(S): at 00:08

## 2022-06-12 RX ADMIN — OXYCODONE HYDROCHLORIDE 10 MILLIGRAM(S): 5 TABLET ORAL at 21:55

## 2022-06-12 RX ADMIN — Medication 975 MILLIGRAM(S): at 05:18

## 2022-06-12 RX ADMIN — OXYCODONE HYDROCHLORIDE 5 MILLIGRAM(S): 5 TABLET ORAL at 12:54

## 2022-06-12 RX ADMIN — DEXTROSE MONOHYDRATE, SODIUM CHLORIDE, AND POTASSIUM CHLORIDE 75 MILLILITER(S): 50; .745; 4.5 INJECTION, SOLUTION INTRAVENOUS at 11:58

## 2022-06-12 RX ADMIN — ENOXAPARIN SODIUM 40 MILLIGRAM(S): 100 INJECTION SUBCUTANEOUS at 21:35

## 2022-06-12 RX ADMIN — OXYCODONE HYDROCHLORIDE 5 MILLIGRAM(S): 5 TABLET ORAL at 11:54

## 2022-06-12 RX ADMIN — Medication 40 MILLIEQUIVALENT(S): at 10:13

## 2022-06-12 NOTE — DIETITIAN INITIAL EVALUATION ADULT - PERTINENT LABORATORY DATA
06-12    139  |  104  |  8   ----------------------------<  82  3.3<L>   |  24  |  0.61    Ca    8.8      12 Jun 2022 07:51  Phos  4.0     06-12  Mg     1.90     06-12    TPro  6.8  /  Alb  4.0  /  TBili  0.6  /  DBili  x   /  AST  37<H>  /  ALT  57<H>  /  AlkPhos  139<H>  06-12

## 2022-06-12 NOTE — DIETITIAN INITIAL EVALUATION ADULT - OTHER INFO
28 y/o female, 4 weeks post partum, admitted with dx biliary pancreatitis. Pt said she hasn't had anything yet to eat - only water. Her nausea and vomiting has completely stopped, although she has occasional abdominal pain. She denies food allergies, nausea/vomiting/diarrhea/constipation, or issues with chewing/swallowing; last BM 6/10. She has a recent weight loss of 40 lbs over the past month, however she attributes this to having recently had her baby. She had a normal pregnancy, although a higher weight gain due to having gestational diabetes. Glucoses are now WNLs. Pt to be advanced soon to Clear Liquids. Offered a clear liquid supplement, however pt declined at the moment. Pt without additional nutrition related issues or concerns at this time. Continue to advance diet as medically feasible and tolerated. RDN services to remain available as needed.

## 2022-06-12 NOTE — DIETITIAN INITIAL EVALUATION ADULT - ADD RECOMMEND
1. Advance diet as medically feasible and tolerated. 2. Monitor weights, labs, BM's, skin integrity, p.o. intake. 3. Follow pt as per protocol.

## 2022-06-12 NOTE — PROGRESS NOTE ADULT - SUBJECTIVE AND OBJECTIVE BOX
TEAM B Surgery Progress Note    INTERVAL EVENTS: No acute events overnight.    SUBJECTIVE: Patient seen and examined at bedside with surgical team. Pt denies any new complaints.      OBJECTIVE:    Vital Signs Last 24 Hrs  T(C): 36.9 (2022 21:56), Max: 37 (2022 20:23)  T(F): 98.5 (2022 21:56), Max: 98.6 (2022 20:23)  HR: 88 (2022 21:56) (79 - 96)  BP: 132/82 (2022 21:56) (122/79 - 142/88)  BP(mean): --  RR: 18 (2022 21:56) (16 - 18)  SpO2: 100% (2022 21:56) (100% - 100%)I&O's Detail    2022 07:01  -  2022 01:57  --------------------------------------------------------  IN:    Lactated Ringers: 250 mL  Total IN: 250 mL    OUT:  Total OUT: 0 mL    Total NET: 250 mL      MEDICATIONS  (STANDING):  acetaminophen     Tablet .. 975 milliGRAM(s) Oral every 6 hours  enoxaparin Injectable 40 milliGRAM(s) SubCutaneous every 24 hours  lactated ringers. 1000 milliLiter(s) (125 mL/Hr) IV Continuous <Continuous>    MEDICATIONS  (PRN):  HYDROmorphone  Injectable 0.5 milliGRAM(s) IV Push every 4 hours PRN Moderate Pain (4 - 6)  HYDROmorphone  Injectable 1 milliGRAM(s) IV Push every 4 hours PRN Severe Pain (7 - 10)  ondansetron Injectable 4 milliGRAM(s) IV Push every 8 hours PRN Nausea and/or Vomiting      PHYSICAL EXAM:  Gen: No acute distress.  Abd: Soft, epigastric and LUQ tenderness, nondistended, no rebound, no guarding.  Ext: Warm and well-perfused    LABS:                        11.7   7.86  )-----------( 358      ( 2022 15:17 )             36.8     06-11    140  |  104  |  9   ----------------------------<  101<H>  3.7   |  23  |  0.62    Ca    9.0      2022 15:17    TPro  7.3  /  Alb  4.4  /  TBili  0.7  /  DBili  x   /  AST  59<H>  /  ALT  67<H>  /  AlkPhos  155<H>        LIVER FUNCTIONS - ( 2022 15:17 )  Alb: 4.4 g/dL / Pro: 7.3 g/dL / ALK PHOS: 155 U/L / ALT: 67 U/L / AST: 59 U/L / GGT: x           Urinalysis Basic - ( 2022 15:20 )    Color: Maame / Appearance: Clear / S.046 / pH: x  Gluc: x / Ketone: Trace  / Bili: Small / Urobili: 3 mg/dL   Blood: x / Protein: 100 mg/dL / Nitrite: Negative   Leuk Esterase: Negative / RBC: 7 /HPF / WBC 11 /HPF   Sq Epi: x / Non Sq Epi: 15 /HPF / Bacteria: Negative          IMAGING:     TEAM B Surgery Progress Note    INTERVAL EVENTS: No acute events overnight.    SUBJECTIVE: Patient seen and examined at bedside with surgical team. Pt denies any new complaints. Pain controlled. No nausea/vomiting.      OBJECTIVE:    Vital Signs Last 24 Hrs  T(C): 36.9 (2022 21:56), Max: 37 (2022 20:23)  T(F): 98.5 (2022 21:56), Max: 98.6 (2022 20:23)  HR: 88 (:56) (79 - 96)  BP: 132/82 (:56) (122/79 - 142/88)  BP(mean): --  RR: 18 (:56) (16 - 18)  SpO2: 100% (:56) (100% - 100%)I&O's Detail    2022 07:01  -  2022 01:57  --------------------------------------------------------  IN:    Lactated Ringers: 250 mL  Total IN: 250 mL    OUT:  Total OUT: 0 mL    Total NET: 250 mL      MEDICATIONS  (STANDING):  acetaminophen     Tablet .. 975 milliGRAM(s) Oral every 6 hours  enoxaparin Injectable 40 milliGRAM(s) SubCutaneous every 24 hours  lactated ringers. 1000 milliLiter(s) (125 mL/Hr) IV Continuous <Continuous>    MEDICATIONS  (PRN):  HYDROmorphone  Injectable 0.5 milliGRAM(s) IV Push every 4 hours PRN Moderate Pain (4 - 6)  HYDROmorphone  Injectable 1 milliGRAM(s) IV Push every 4 hours PRN Severe Pain (7 - 10)  ondansetron Injectable 4 milliGRAM(s) IV Push every 8 hours PRN Nausea and/or Vomiting      PHYSICAL EXAM:  Gen: NAD, resting comfortably in bed  CV: Regular rate  Resp: Nonlabored breathing on room air  Abd: Soft, epigastric and LUQ tenderness, nondistended, no rebound, no guarding  Ext: Warm and well-perfused      LABS:                        11.7   7.86  )-----------( 358      ( 2022 15:17 )             36.8     06-11    140  |  104  |  9   ----------------------------<  101<H>  3.7   |  23  |  0.62    Ca    9.0      2022 15:17    TPro  7.3  /  Alb  4.4  /  TBili  0.7  /  DBili  x   /  AST  59<H>  /  ALT  67<H>  /  AlkPhos  155<H>        LIVER FUNCTIONS - ( 2022 15:17 )  Alb: 4.4 g/dL / Pro: 7.3 g/dL / ALK PHOS: 155 U/L / ALT: 67 U/L / AST: 59 U/L / GGT: x           Urinalysis Basic - ( 2022 15:20 )    Color: Maame / Appearance: Clear / S.046 / pH: x  Gluc: x / Ketone: Trace  / Bili: Small / Urobili: 3 mg/dL   Blood: x / Protein: 100 mg/dL / Nitrite: Negative   Leuk Esterase: Negative / RBC: 7 /HPF / WBC 11 /HPF   Sq Epi: x / Non Sq Epi: 15 /HPF / Bacteria: Negative          IMAGING:

## 2022-06-12 NOTE — PROGRESS NOTE ADULT - ASSESSMENT
Assessment: 28yo F with a history of gallstones who is 4 wks postpartum after a c/section presenting with abdominal pain found to have gallstone pancreatitis.    PLAN:  - NPO/IVF  - Pain control as needed  - Trend abdominal exam  - Trend LFTs and tbili  - Recommend lap jessee when pancreatitis resolves, but patient initially refusing. Will continue to discuss.    B Team Surgery   [id number]  28yo F with a history of gallstones who is 4 wks postpartum after a c/section presenting with abdominal pain found to have gallstone pancreatitis.    Plan:  - NPO  - mIVF @ 75 cc/hr  - Pain control  - Trend abdominal exam  - Trend LFTs and tbili  - Replete electrolytes prn  - Discuss inpatient vs interval lap jessee      B Team Surgery   [id number]  28yo F with a history of gallstones who is 4 wks postpartum after a c/section presenting with abdominal pain found to have gallstone pancreatitis.    Plan:  - CLD  - mIVF @ 75 cc/hr  - Pain control  - Trend abdominal exam  - Trend LFTs and tbili  - Replete electrolytes prn  - Discuss inpatient vs interval lap jessee      B Team Surgery   [id number]

## 2022-06-12 NOTE — DIETITIAN INITIAL EVALUATION ADULT - PERTINENT MEDS FT
MEDICATIONS  (STANDING):  acetaminophen     Tablet .. 975 milliGRAM(s) Oral every 6 hours  dextrose 5% + sodium chloride 0.45% with potassium chloride 20 mEq/L 1000 milliLiter(s) (75 mL/Hr) IV Continuous <Continuous>  enoxaparin Injectable 40 milliGRAM(s) SubCutaneous every 24 hours  potassium chloride    Tablet ER 40 milliEquivalent(s) Oral every 4 hours    MEDICATIONS  (PRN):  HYDROmorphone  Injectable 1 milliGRAM(s) IV Push every 4 hours PRN Severe Pain (7 - 10)  ondansetron Injectable 4 milliGRAM(s) IV Push every 8 hours PRN Nausea and/or Vomiting  oxyCODONE    IR 5 milliGRAM(s) Oral every 4 hours PRN Moderate Pain (4 - 6)  oxyCODONE    IR 10 milliGRAM(s) Oral every 4 hours PRN Severe Pain (7 - 10)

## 2022-06-13 ENCOUNTER — TRANSCRIPTION ENCOUNTER (OUTPATIENT)
Age: 27
End: 2022-06-13

## 2022-06-13 VITALS
SYSTOLIC BLOOD PRESSURE: 118 MMHG | RESPIRATION RATE: 17 BRPM | OXYGEN SATURATION: 100 % | DIASTOLIC BLOOD PRESSURE: 64 MMHG | TEMPERATURE: 99 F | HEART RATE: 84 BPM

## 2022-06-13 LAB
ALBUMIN SERPL ELPH-MCNC: 4 G/DL — SIGNIFICANT CHANGE UP (ref 3.3–5)
ALP SERPL-CCNC: 123 U/L — HIGH (ref 40–120)
ALT FLD-CCNC: 43 U/L — HIGH (ref 4–33)
ANION GAP SERPL CALC-SCNC: 10 MMOL/L — SIGNIFICANT CHANGE UP (ref 7–14)
AST SERPL-CCNC: 24 U/L — SIGNIFICANT CHANGE UP (ref 4–32)
BILIRUB DIRECT SERPL-MCNC: <0.2 MG/DL — SIGNIFICANT CHANGE UP (ref 0–0.3)
BILIRUB INDIRECT FLD-MCNC: >0.3 MG/DL — SIGNIFICANT CHANGE UP (ref 0–1)
BILIRUB SERPL-MCNC: 0.5 MG/DL — SIGNIFICANT CHANGE UP (ref 0.2–1.2)
BUN SERPL-MCNC: 5 MG/DL — LOW (ref 7–23)
CALCIUM SERPL-MCNC: 8.9 MG/DL — SIGNIFICANT CHANGE UP (ref 8.4–10.5)
CHLORIDE SERPL-SCNC: 104 MMOL/L — SIGNIFICANT CHANGE UP (ref 98–107)
CO2 SERPL-SCNC: 25 MMOL/L — SIGNIFICANT CHANGE UP (ref 22–31)
CREAT SERPL-MCNC: 0.64 MG/DL — SIGNIFICANT CHANGE UP (ref 0.5–1.3)
EGFR: 126 ML/MIN/1.73M2 — SIGNIFICANT CHANGE UP
GLUCOSE SERPL-MCNC: 86 MG/DL — SIGNIFICANT CHANGE UP (ref 70–99)
HCT VFR BLD CALC: 32.3 % — LOW (ref 34.5–45)
HGB BLD-MCNC: 10.1 G/DL — LOW (ref 11.5–15.5)
MAGNESIUM SERPL-MCNC: 1.8 MG/DL — SIGNIFICANT CHANGE UP (ref 1.6–2.6)
MCHC RBC-ENTMCNC: 28.2 PG — SIGNIFICANT CHANGE UP (ref 27–34)
MCHC RBC-ENTMCNC: 31.3 GM/DL — LOW (ref 32–36)
MCV RBC AUTO: 90.2 FL — SIGNIFICANT CHANGE UP (ref 80–100)
NRBC # BLD: 0 /100 WBCS — SIGNIFICANT CHANGE UP
NRBC # FLD: 0 K/UL — SIGNIFICANT CHANGE UP
PHOSPHATE SERPL-MCNC: 3.8 MG/DL — SIGNIFICANT CHANGE UP (ref 2.5–4.5)
PLATELET # BLD AUTO: 279 K/UL — SIGNIFICANT CHANGE UP (ref 150–400)
POTASSIUM SERPL-MCNC: 3.9 MMOL/L — SIGNIFICANT CHANGE UP (ref 3.5–5.3)
POTASSIUM SERPL-SCNC: 3.9 MMOL/L — SIGNIFICANT CHANGE UP (ref 3.5–5.3)
PROT SERPL-MCNC: 6.5 G/DL — SIGNIFICANT CHANGE UP (ref 6–8.3)
RBC # BLD: 3.58 M/UL — LOW (ref 3.8–5.2)
RBC # FLD: 12.8 % — SIGNIFICANT CHANGE UP (ref 10.3–14.5)
SODIUM SERPL-SCNC: 139 MMOL/L — SIGNIFICANT CHANGE UP (ref 135–145)
WBC # BLD: 4.44 K/UL — SIGNIFICANT CHANGE UP (ref 3.8–10.5)
WBC # FLD AUTO: 4.44 K/UL — SIGNIFICANT CHANGE UP (ref 3.8–10.5)

## 2022-06-13 PROCEDURE — 99231 SBSQ HOSP IP/OBS SF/LOW 25: CPT | Mod: GC

## 2022-06-13 RX ORDER — MAGNESIUM SULFATE 500 MG/ML
2 VIAL (ML) INJECTION ONCE
Refills: 0 | Status: COMPLETED | OUTPATIENT
Start: 2022-06-13 | End: 2022-06-13

## 2022-06-13 RX ADMIN — Medication 975 MILLIGRAM(S): at 05:01

## 2022-06-13 RX ADMIN — Medication 25 GRAM(S): at 11:01

## 2022-06-13 RX ADMIN — Medication 975 MILLIGRAM(S): at 05:31

## 2022-06-13 RX ADMIN — Medication 975 MILLIGRAM(S): at 12:03

## 2022-06-13 NOTE — DISCHARGE NOTE NURSING/CASE MANAGEMENT/SOCIAL WORK - PATIENT PORTAL LINK FT
You can access the FollowMyHealth Patient Portal offered by [facility] by registering at the following website: [email or web address]. By joining [employer]’s FollowMyHealth portal, you will also be able to view your health information using other applications (apps) compatible with our system.

## 2022-06-13 NOTE — PROGRESS NOTE ADULT - ATTENDING COMMENTS
GS pancreatitis  a  Afebrile, VSS overnight  b  Epigastric pain and tenderness  c.  Trend LFTs  d.  Discuss possible in patient vs interval  cholecystectomy    At risk for malnutrition  a. May have clears    Hypokalemia  a.  Replete K  b.  Trend serum K
I have personally interviewed and examined this patient, reviewed pertinent labs and imaging, and discussed the case with colleagues, residents, and physician assistants on B Team rounds.  See adjacent note by PA/resident.    Chief complaint:  abdominal pain    The active care issues are:  1. gallstone pancreatitis, resolved    Patient offered laparoscopic cholecystectomy but prefers to pursue this at a later time due to  at home as well as wanting to discuss this with her family doc.  May d/c home if tolerating regular diet without pain.    The Acute Care Surgery (B Team) Practice:    urgent issues - spectra [id number]  nonurgent issues - [phone]  patient appointments or afterhours - [phone]

## 2022-06-13 NOTE — DISCHARGE NOTE PROVIDER - NSDCCPCAREPLAN_GEN_ALL_CORE_FT
PRINCIPAL DISCHARGE DIAGNOSIS  Diagnosis: Gallstone pancreatitis  Assessment and Plan of Treatment: WOUND CARE:  Please keep incisions clean and dry. Please do not Scrub or rub incisions. Do not use lotion or powder on incisions.   BATHING: You may shower and/or sponge bathe. You may use warm soapy water in the shower and rinse, pat dry.  ACTIVITY: No heavy lifting or straining. Otherwise, you may return to your usual level of physical activity. If you are taking narcotic pain medication DO NOT drive a car, operate machinery or make important decisions.  DIET: low fat diet.  NOTIFY YOUR SURGEON IF YOU HAVE: any bleeding that does not stop, any pus draining from your wound(s), any fever (over 100.4 F) persistent nausea/vomiting, or if your pain is not controlled on your discharge pain medications, unable to urinate.  Please follow up with your primary care physician in one week regarding your hospitalization, bring copies of your discharge paperwork.  Please follow up with your surgeon, Dr. Newton as an outpatient to plan surgery to have your gallbladder removed, please call to schedule appointment

## 2022-06-13 NOTE — PROGRESS NOTE ADULT - SUBJECTIVE AND OBJECTIVE BOX
SURGERY PROGRESS NOTE  Hospital Day #2      SUBJECTIVE  Pt seen and examined at bedside. No complaints.  Pain controlled. Denies N/V. Tolerating clear liquid diet. No acute events overnight.         OBJECTIVE:    PHYSICAL EXAM   General Appearance: Appears well, NAD  Resp: Patent airway, non-labored breathing  CV: RRR  Abdomen: Soft, Nontender, Nondistended      Vital Signs Last 24 Hrs  T(C): 36.9 (:13), Max: 37.1 (2022 18:02)  T(F): 98.4 (:13), Max: 98.8 (2022 18:02)  HR: 80 (:) (61 - 80)  BP: 138/86 (:) (116/72 - 141/83)  BP(mean): --  RR: 17 (:13) (16 - 18)  SpO2: 98% (:) (97% - 99%)      I's & O's    22 @ 07:01  -  22 @ 07:00  --------------------------------------------------------  IN: 1250 mL / OUT: 0 mL / NET: 1250 mL    22 @ 07:01  -  22 @ 00:23  --------------------------------------------------------  IN: 0 mL / OUT: 750 mL / NET: -750 mL         LAB/STUDIES:                        10.4   5.04  )-----------( 292      ( 2022 07:51 )             32.7     139  |  104  |  8   ----------------------------<  82  3.3<L>   |  24  |  0.61    Ca    8.8      2022 07:51  Phos  4.0     06-12  Mg     1.90     06-12    TPro  6.8  /  Alb  4.0  /  TBili  0.6  /  DBili  x   /  AST  37<H>  /  ALT  57<H>  /  AlkPhos  139<H>  -12      LIVER FUNCTIONS - ( 2022 07:51 )  Alb: 4.0 g/dL / Pro: 6.8 g/dL / ALK PHOS: 139 U/L / ALT: 57 U/L / AST: 37 U/L / GGT: x           Urinalysis Basic - ( 2022 15:20 )    Color: Maame / Appearance: Clear / S.046 / pH: x  Gluc: x / Ketone: Trace  / Bili: Small / Urobili: 3 mg/dL   Blood: x / Protein: 100 mg/dL / Nitrite: Negative   Leuk Esterase: Negative / RBC: 7 /HPF / WBC 11 /HPF   Sq Epi: x / Non Sq Epi: 15 /HPF / Bacteria: Negative      Culture - Urine (collected 2022 15:20)  Source: Clean Catch Clean Catch (Midstream)  Final Report (2022 22:00):    Normal Urogenital ellen present     SURGERY PROGRESS NOTE  Hospital Day #2      SUBJECTIVE  Pt seen and examined at bedside. No complaints.  Pain controlled. Denies N/V. Tolerating clear liquid diet. No acute events overnight.     OBJECTIVE:    PHYSICAL EXAM   General Appearance: Appears well, NAD  Resp: Patent airway, non-labored breathing  CV: regular rate  Abdomen: Soft, Nontender, Nondistended      Vital Signs Last 24 Hrs  T(C): 36.9 (:13), Max: 37.1 (2022 18:02)  T(F): 98.4 (:13), Max: 98.8 (2022 18:02)  HR: 80 (:) (61 - 80)  BP: 138/86 (:) (116/72 - 141/83)  BP(mean): --  RR: 17 (:) (16 - 18)  SpO2: 98% (:) (97% - 99%)      I's & O's    22 @ 07:01  -  22 @ 07:00  --------------------------------------------------------  IN: 1250 mL / OUT: 0 mL / NET: 1250 mL    22 @ 07:01  -  22 @ 00:23  --------------------------------------------------------  IN: 0 mL / OUT: 750 mL / NET: -750 mL         LAB/STUDIES:                        10.4   5.04  )-----------( 292      ( 2022 07:51 )             32.7     139  |  104  |  8   ----------------------------<  82  3.3<L>   |  24  |  0.61    Ca    8.8      2022 07:51  Phos  4.0     06-12  Mg     1.90     06-12    TPro  6.8  /  Alb  4.0  /  TBili  0.6  /  DBili  x   /  AST  37<H>  /  ALT  57<H>  /  AlkPhos  139<H>  -12      LIVER FUNCTIONS - ( 2022 07:51 )  Alb: 4.0 g/dL / Pro: 6.8 g/dL / ALK PHOS: 139 U/L / ALT: 57 U/L / AST: 37 U/L / GGT: x           Urinalysis Basic - ( 2022 15:20 )    Color: Maame / Appearance: Clear / S.046 / pH: x  Gluc: x / Ketone: Trace  / Bili: Small / Urobili: 3 mg/dL   Blood: x / Protein: 100 mg/dL / Nitrite: Negative   Leuk Esterase: Negative / RBC: 7 /HPF / WBC 11 /HPF   Sq Epi: x / Non Sq Epi: 15 /HPF / Bacteria: Negative      Culture - Urine (collected 2022 15:20)  Source: Clean Catch Clean Catch (Midstream)  Final Report (2022 22:00):    Normal Urogenital ellen present

## 2022-06-13 NOTE — PROGRESS NOTE ADULT - ASSESSMENT
28yo F with a history of gallstones who is 4 wks postpartum after a c/section presenting with abdominal pain found to have gallstone pancreatitis.    Plan:  - CLD  - mIVF @ 75 cc/hr  - Pain control  - Trend abdominal exam  - Trend LFTs and tbili  - Replete electrolytes prn  - Discuss inpatient vs interval lap jessee      B Team Surgery   [id number]  28yo F with a history of gallstones who is 4 wks postpartum after a c/section presenting with abdominal pain found to have gallstone pancreatitis.    Plan:  - Regular diet  - Pain control  - Trend abdominal exam  - Trend LFTs and TBili  - Replete electrolytes prn  - Plan for interval lap jessee  - D/c home today    B Team Surgery   [phone]

## 2022-06-13 NOTE — DISCHARGE NOTE NURSING/CASE MANAGEMENT/SOCIAL WORK - NSDCPEFALRISK_GEN_ALL_CORE
For information on Fall & Injury Prevention, visit: [email or web address] OR  [email or web address] OR  https://www.cdc.gov/steadi/patient.html

## 2022-06-13 NOTE — DISCHARGE NOTE PROVIDER - HOSPITAL COURSE
28yo F with a history of gallstones who is 4 wks postpartum after a c/section presenting with 1 day of epigastric pain. Patient reports onset of epigastric, RUQ and LUQ abdominal pain last night at 7pm after dinner, associated with nausea and one episode of emesis. Denies fevers or chills. Had similar pain once before but it went away quickly.    In the ED, patient stable. Labs showed WBC 6, tbili 0.7, AST/ALT 59/67, lipase >3000. RUQ US showed CBD 3mm and gallstones but no signs of cholecystitis.    Pt admitted to surgical service and resuscitated with IVF.  Plan for laparoscopic cholecystectomy during admission when pancreatitis resolves vs interval cholecystectomy    When improvement symptoms diet slowly advanced as tolerated. Pt does not want to have cholecystectomy during this admission so plan to dc and follow up as an outpatient    Per Attending pt now stable for discharge home. Pt hemodynamically stable, tolerating diet and pain well controlled. Pt to follow up with PMD and surgeon as an outpatient, instructed to call to schedule appointment

## 2022-06-13 NOTE — DISCHARGE NOTE PROVIDER - CARE PROVIDER_API CALL
Mello Newton)  Surgery; Surgical Critical Care  [address] [place] [address]  Phone: [phone]  Fax: [phone]  Follow Up Time:

## 2023-06-04 ENCOUNTER — EMERGENCY (EMERGENCY)
Facility: HOSPITAL | Age: 28
LOS: 1 days | Discharge: HOME | End: 2023-06-04
Attending: EMERGENCY MEDICINE | Admitting: EMERGENCY MEDICINE
Payer: MEDICAID

## 2023-06-04 VITALS
SYSTOLIC BLOOD PRESSURE: 134 MMHG | HEART RATE: 84 BPM | DIASTOLIC BLOOD PRESSURE: 86 MMHG | RESPIRATION RATE: 18 BRPM | OXYGEN SATURATION: 100 % | TEMPERATURE: 98 F

## 2023-06-04 VITALS
DIASTOLIC BLOOD PRESSURE: 91 MMHG | RESPIRATION RATE: 16 BRPM | HEART RATE: 88 BPM | OXYGEN SATURATION: 100 % | TEMPERATURE: 98 F | SYSTOLIC BLOOD PRESSURE: 136 MMHG

## 2023-06-04 DIAGNOSIS — Z98.891 HISTORY OF UTERINE SCAR FROM PREVIOUS SURGERY: Chronic | ICD-10-CM

## 2023-06-04 LAB
APPEARANCE UR: CLEAR — SIGNIFICANT CHANGE UP
BACTERIA # UR AUTO: ABNORMAL
BILIRUB UR-MCNC: NEGATIVE — SIGNIFICANT CHANGE UP
COLOR SPEC: YELLOW — SIGNIFICANT CHANGE UP
DIFF PNL FLD: NEGATIVE — SIGNIFICANT CHANGE UP
EPI CELLS # UR: 2 /HPF — SIGNIFICANT CHANGE UP (ref 0–5)
GLUCOSE UR QL: NEGATIVE — SIGNIFICANT CHANGE UP
HCG SERPL-ACNC: 473 MIU/ML — SIGNIFICANT CHANGE UP
HYALINE CASTS # UR AUTO: 1 /LPF — SIGNIFICANT CHANGE UP (ref 0–7)
KETONES UR-MCNC: ABNORMAL
LEUKOCYTE ESTERASE UR-ACNC: NEGATIVE — SIGNIFICANT CHANGE UP
NITRITE UR-MCNC: NEGATIVE — SIGNIFICANT CHANGE UP
PH UR: 6 — SIGNIFICANT CHANGE UP (ref 5–8)
PROT UR-MCNC: ABNORMAL
RBC CASTS # UR COMP ASSIST: 1 /HPF — SIGNIFICANT CHANGE UP (ref 0–4)
SP GR SPEC: 1.03 — SIGNIFICANT CHANGE UP (ref 1.01–1.05)
UROBILINOGEN FLD QL: SIGNIFICANT CHANGE UP
WBC UR QL: 1 /HPF — SIGNIFICANT CHANGE UP (ref 0–5)

## 2023-06-04 PROCEDURE — 99284 EMERGENCY DEPT VISIT MOD MDM: CPT

## 2023-06-04 RX ORDER — CEPHALEXIN 500 MG
1 CAPSULE ORAL
Qty: 16 | Refills: 0
Start: 2023-06-04 | End: 2023-06-08

## 2023-06-04 RX ORDER — CEPHALEXIN 500 MG
500 CAPSULE ORAL ONCE
Refills: 0 | Status: COMPLETED | OUTPATIENT
Start: 2023-06-04 | End: 2023-06-04

## 2023-06-04 RX ADMIN — Medication 500 MILLIGRAM(S): at 10:50

## 2023-06-04 NOTE — ED ADULT TRIAGE NOTE - CHIEF COMPLAINT QUOTE
pt had positive pregnancy tests at home and "wants to get checked", no bleeding or cramping, LMP March

## 2023-06-04 NOTE — ED ADULT NURSE NOTE - OBJECTIVE STATEMENT
Pt received to room 1 states she had a positive pregnancy test. pt states her LMP was 4/21. pt denies any complains and wants to just be checked out.

## 2023-06-04 NOTE — ED PROVIDER NOTE - OBJECTIVE STATEMENT
HPI & ROS: 28-year-old female history of gallstone pancreatitis, regular periods, gestational diabetes and last pregnancy,  last period  presenting with positive pregnancy test.  Patient with positive pregnancy test after having 2 negatives.  Has 1 child prior.  Gestational diabetes noted.  Patient without polyuria dysuria.  No vaginal discharge or bleeding.  No abdominal pain.  No fevers.    Exam as stated below:   CONSTITUTIONAL: In NAD.   SKIN: Warm dry. No rashes noted.   EYES: No scleral icterus. Conjunctiva pink.  CARD: RRR. No murmurs.  RESP: Clear to ausculation b/l. No Crackles noted. No Wheezing noted.  ABD: Soft. Non-tender. Not distended.   MSK: No pedal edema. No calf tenderness.  NEURO: UE/LE grossly intact. Motor UE/LE sensation grossly intact. CN II-XII grossly intact.   PSYCH: Cooperative, appropriate.

## 2023-06-04 NOTE — ED PROVIDER NOTE - CLINICAL SUMMARY MEDICAL DECISION MAKING FREE TEXT BOX
MDM & Summary:   Well-appearing 28-year-old G2, P1 with the last menstrual period April 21,  approximately 6 weeks pregnant.  Vital signs reviewed.  DDx:   Exam and history not consistent with ectopic or other intra-abdominal pathology.  Vital signs reviewed.  Less likely infectious, will not pursue.  Likely patient is pregnant.  Without adnexal tenderness.  Labs: HCG, UA UC   Imaging: none,   Less utility given benign abdominal exam without bleeding to do ultrasound at this point  Tx: supportive   Consults/Resources: likely not   Dispo: likely dc home, will send to her OBGYN     Triage note reviewed. VS reviewed. EKG reviewed and documented in "RESULTS" section, if possible at given time.     DDx in MDM includes the most likely ddx, but is not limited to solely what is listed. Progress notes written as needed, and are included in "PROGRESS NOTE" section below.       Medical, family, and social determinants of health reviewed and discussed w/ pt/family/caretaker, when allowable, and is incorporated into note above, whenever possible. MDM & Summary:   Well-appearing 28-year-old G2, P1 with the last menstrual period April 21,  approximately 6 weeks pregnant.  Vital signs reviewed.  DDx:   Exam and history not consistent with ectopic or other intra-abdominal pathology.  Vital signs reviewed.  Less likely infectious, will not pursue.  Likely patient is pregnant.  Without adnexal tenderness.  Labs: HCG, UA UC   Imaging: none,   Less utility given benign abdominal exam without bleeding to do ultrasound at this point  Tx: supportive   Consults/Resources: likely not   Dispo: likely dc home, will send to her OBGYN   Triage note reviewed. VS reviewed. EKG reviewed and documented in "RESULTS" section, if possible at given time.   DDx in MDM includes the most likely ddx, but is not limited to solely what is listed. Progress notes written as needed, and are included in "PROGRESS NOTE" section below.     Medical, family, and social determinants of health reviewed and discussed w/ pt/family/caretaker, when allowable, and is incorporated into note above, whenever possible.  True: +preg test at home, no cramping or bleeding, hx of gest dm will check fs. will get preg test. with no bleeding or cramping no US needed. will refer to outpt OB

## 2023-06-04 NOTE — ED PROVIDER NOTE - PROGRESS NOTE DETAILS
gumaro DOLAN PGY-1: hCG approximately 430.  Some bacteria, will send Keflex to pharmacy okay to discharge in the care of home OB/GYN

## 2023-06-04 NOTE — ED PROVIDER NOTE - PATIENT PORTAL LINK FT
You can access the FollowMyHealth Patient Portal offered by [facility] by registering at the following website: [email or web address]. By joining [facility]’s FollowMyHealth portal, you will also be able to view your health information using other applications (apps) compatible with our system.

## 2023-06-04 NOTE — ED PROVIDER NOTE - NSFOLLOWUPINSTRUCTIONS_ED_ALL_ED_FT
-  you came to the emergency room because you wanted to check if you are pregnant.  Reasons to come back to the emergency room include but are not limited to passing clots, large amount of vaginal bleeding, different abdominal pain than you are used to, confusion or lethargy. Or high/sustained fevers.    - Your testing/exams was/were reassuring that dangerous emergencies/conditions are less likely to be occurring or to have occurred.    - Take all medications as directed.    - For pain or fever you can take acetaminophen (tylenol) as needed, as directed on packaging.  - Follow up with your primary doctor within 5 days as directed.  - If you had labs or imaging done, you were given copies of all labs and/or imaging results from your er visit--please take them with you to your follow up appointments.  - If needed, call patient access services at [phone] to find a primary care physician (PCP). Call this number to follow up with a specialty service, such as the spine clinic. If you need this, call and say you were recently in the emergency department and you are calling, per my orders.   - Make sure you do not require a primary care physician's referral if you make a specialty clinic appointment directly. Some insurance requires you to see your PCP, get a referral, then make a specialty appointment.

## 2023-09-25 NOTE — PATIENT PROFILE ADULT - COMPLETE THE FOLLOWING IF THE PATIENT REFUSES THE INFLUENZA VACCINE:
Physical Therapy Visit    Visit Type: Daily Treatment Note  Visit: 8  Referring Provider: Shannon Jackson PA-C  Medical Diagnosis (from order): Diagnosis Information    Diagnosis  L73.2 (ICD-10-CM) - Hidradenitis suppurativa  Z98.890 (ICD-10-CM) - Post-operative state         SUBJECTIVE                                                                                                               Reports that she is still noticing improvements everyday with reaching higher.        OBJECTIVE                                                                                                                                       Treatment     Therapeutic Exercise  -UBE 60 RPM x 4 minute- less fatigue  -standing scaption 2# with mirror x 10   -overhead press 2# x 10   -15# KB suitcase carry 1 lap (slight elbow flexion)  -landmine press attachment, two hand overhead press 10 x 2 (switching top hand)  -mod prone T's 2# 10 x 2 (alternated with landmine press)  -lifting 10# Dumbbell counter height to overhead shelf (with both hands ) x 10   -red band horizontal abduciton and 90 degrees flexion  -forearm carry with 25# plate x 1 lap  -cable column bar push with 10# and pull with 10# x10 each  -single arm row with 10# cable x 15  -carrying tray with 10#  And then with 12# overhead with both hands to fatigue   -25# dumbbell floor to counter height (both arms)  -overhead work with 2# on right wrist  -pressing 10# plate out at chest height x 0               Manual Therapy   Manual edema and soft tissue mobilization through lateral trunk and axilla alicia.  Improving mobility   PROM and stretching into shoulder   Glenohumeral joint mobility inferior and posterior with some tightness    Skilled input: verbal instruction/cues    Writer verbally educated and received verbal consent for hand placement, positioning of patient, and techniques to be performed today from patient for therapist position for techniques as described above and how they 
are pertinent to the patient's plan of care.  Home Exercise Program  Access Code: [email or web address]  URL: [email or web address]  Date: 08/31/2023  Prepared by: Magnolia Renteria    Exercises  - Supine Shoulder Flexion with Dowel  - 5 x daily - 7 x weekly - 1 sets - 10 reps  - Standing Lumbar Spine Flexion Stretch Counter  - 5 x daily - 7 x weekly - 1 sets - 10 reps  - Shoulder Flexion Wall Slide with Towel  - 5 x daily - 7 x weekly - 1 sets - 10 reps  - Standing Shoulder Extension with Dowel  - 5 x daily - 7 x weekly - 1 sets - 10 reps  - Standing Shoulder Abduction AAROM with Dowel  - 5 x daily - 7 x weekly - 1 sets - 10 reps  - Prone Chest Stretch on Chair  - 1 x daily - 7 x weekly - 1 sets - 10 reps  - Sidelying Open Book Thoracic Rotation with Knee on Foam Roll  - 1 x daily - 7 x weekly - 3 sets - 10 reps  - Shoulder Internal Rotation with Resistance  - 1 x daily - 7 x weekly - 3 sets - 10 reps  - Seated Shoulder External Rotation with Resistance  - 1 x daily - 7 x weekly - 3 sets - 10 reps  - Seated Elbow Flexion with Resistance  - 1 x daily - 7 x weekly - 3 sets - 10 reps      ASSESSMENT                                                                                                            Continues to have improvements in strength and motion.  Will conitnue with skilled PT  Pain/symptoms after session (out of 10): 1       Therapy procedure time and total treatment time can be found documented on the Time Entry flowsheet    
Risks/benefits discussed with patient/surrogate
Statement Selected

## 2024-10-28 NOTE — ED ADULT NURSE NOTE - NSFALLUNIVINTERV_ED_ALL_ED
20 Bed/Stretcher in lowest position, wheels locked, appropriate side rails in place/Call bell, personal items and telephone in reach/Instruct patient to call for assistance before getting out of bed/chair/stretcher/Non-slip footwear applied when patient is off stretcher/[place] to call system/Physically safe environment - no spills, clutter or unnecessary equipment/Purposeful proactive rounding/Room/bathroom lighting operational, light cord in reach